# Patient Record
Sex: FEMALE | Race: WHITE | URBAN - METROPOLITAN AREA
[De-identification: names, ages, dates, MRNs, and addresses within clinical notes are randomized per-mention and may not be internally consistent; named-entity substitution may affect disease eponyms.]

---

## 2024-08-16 ENCOUNTER — TELEPHONE (OUTPATIENT)
Dept: WOMENS IMAGING | Age: 69
End: 2024-08-16

## 2024-08-19 ENCOUNTER — TELEPHONE (OUTPATIENT)
Dept: WOMENS IMAGING | Age: 69
End: 2024-08-19

## 2024-08-19 NOTE — TELEPHONE ENCOUNTER
[facility]  [address], [address]  [place], [place] [address]   Phone: [phone]          NAME:  Dolores Lovelace  YOB: 1955  MEDICAL RECORD NUMBER:  <[id number]>  TODAY'S DATE:  8/19/2024      Referring Physician: Dr. Sanchez    Procedure: MRI-guided Breast Biopsy    Right Breast    Date of biopsy: 9/10/24    Patient taking blood thinners: no    Medicine allergies: no    Special Instructions: n/a    Reviewed pre and post biopsy instructions/information with patient.  Pt verbalized understanding.     Biopsy order form faxed to referring MD.      Nurse Navigator reviewed the education with the patient regarding an MRI biopsy:  *The technologist or a nurse may ask if you have allergies of any kind, such as an allergy to iodine or x-ray contrast material, drugs, food, or the environment, or if you have asthma. The contrast material most commonly used for an MRI exam contains a metal called gadolinium.  It is fine to eat and drink prior to the procedure and we prefer that you do so.  Bring a written list of the medications you are taking.  Plan on being at the breast center for 2 -3 hours.   Wear a bra with good support (like a sports bra) and a two-piece comfortable outfit for the procedure.   You can bring someone with you but it is not required, since this is done with local anesthetic.  You may drive yourself, or bring a family member or friend, whatever is comfortable and supportive.    If you have claustrophobia (fear of enclosed spaces) or anxiety, you may want to ask your physician for a prescription for a mild sedative prior to your scheduled examination.  If this is done, please have someone drive you to the procedure.  During your biopsy:  You will be lying on your stomach for this procedure just as when you had the MRI.   If a contrast material will be used in the MRI exam, the technologist will insert an intravenous (IV) catheter, into a vein in

## 2024-09-10 ENCOUNTER — HOSPITAL ENCOUNTER (OUTPATIENT)
Dept: MRI IMAGING | Age: 69
Discharge: HOME | End: 2024-09-10
Payer: COMMERCIAL

## 2024-09-10 ENCOUNTER — HOSPITAL ENCOUNTER (OUTPATIENT)
Dept: WOMENS IMAGING | Age: 69
Discharge: HOME | End: 2024-09-10
Payer: COMMERCIAL

## 2024-09-10 DIAGNOSIS — R92.8 ABNORMAL MAMMOGRAM: ICD-10-CM

## 2024-09-10 PROCEDURE — G0279 TOMOSYNTHESIS, MAMMO: HCPCS

## 2024-09-10 PROCEDURE — 88341 IMHCHEM/IMCYTCHM EA ADD ANTB: CPT

## 2024-09-10 PROCEDURE — 19085 BX BREAST 1ST LESION MR IMAG: CPT

## 2024-09-10 PROCEDURE — 88305 TISSUE EXAM BY PATHOLOGIST: CPT

## 2024-09-10 PROCEDURE — 88342 IMHCHEM/IMCYTCHM 1ST ANTB: CPT

## 2024-09-10 PROCEDURE — A9579 GAD-BASE MR CONTRAST NOS,1ML: HCPCS | Performed by: SURGERY

## 2024-09-10 PROCEDURE — 6360000004 HC RX CONTRAST MEDICATION: Performed by: SURGERY

## 2024-09-10 RX ADMIN — GADOTERIDOL 13 ML: 279.3 INJECTION, SOLUTION INTRAVENOUS at 13:12

## 2024-09-12 ENCOUNTER — TELEPHONE (OUTPATIENT)
Dept: WOMENS IMAGING | Age: 69
End: 2024-09-12